# Patient Record
Sex: MALE | Race: WHITE | Employment: UNEMPLOYED | URBAN - METROPOLITAN AREA
[De-identification: names, ages, dates, MRNs, and addresses within clinical notes are randomized per-mention and may not be internally consistent; named-entity substitution may affect disease eponyms.]

---

## 2024-01-23 ENCOUNTER — TELEPHONE (OUTPATIENT)
Dept: PULMONOLOGY | Age: 69
End: 2024-01-23

## 2024-01-23 NOTE — TELEPHONE ENCOUNTER
Npt r/b CONRADO- Salas, Lung Nodule  Please advise for scheduling.    **Imaging and referral scanned in media.

## 2024-01-30 ENCOUNTER — OFFICE VISIT (OUTPATIENT)
Dept: PULMONOLOGY | Age: 69
End: 2024-01-30
Payer: MEDICARE

## 2024-01-30 VITALS
BODY MASS INDEX: 32.95 KG/M2 | HEIGHT: 66 IN | OXYGEN SATURATION: 95 % | RESPIRATION RATE: 18 BRPM | DIASTOLIC BLOOD PRESSURE: 90 MMHG | HEART RATE: 81 BPM | WEIGHT: 205 LBS | SYSTOLIC BLOOD PRESSURE: 158 MMHG

## 2024-01-30 DIAGNOSIS — R91.1 LUNG NODULE: Primary | ICD-10-CM

## 2024-01-30 PROCEDURE — 99214 OFFICE O/P EST MOD 30 MIN: CPT | Performed by: INTERNAL MEDICINE

## 2024-01-30 PROCEDURE — 1123F ACP DISCUSS/DSCN MKR DOCD: CPT | Performed by: INTERNAL MEDICINE

## 2024-01-30 RX ORDER — FLUTICASONE FUROATE, UMECLIDINIUM BROMIDE AND VILANTEROL TRIFENATATE 100; 62.5; 25 UG/1; UG/1; UG/1
1 POWDER RESPIRATORY (INHALATION) DAILY
Qty: 1 EACH | Refills: 2 | Status: SHIPPED | OUTPATIENT
Start: 2024-01-30

## 2024-01-30 RX ORDER — FLUTICASONE FUROATE, UMECLIDINIUM BROMIDE AND VILANTEROL TRIFENATATE 100; 62.5; 25 UG/1; UG/1; UG/1
1 POWDER RESPIRATORY (INHALATION) DAILY
Qty: 1 EACH | Refills: 0 | Status: SHIPPED | COMMUNITY
Start: 2024-01-30

## 2024-01-30 NOTE — PROGRESS NOTES
P  [facility]                                               Pulmonary Clinic Consult     I had the pleasure of seeing  Tj Kim     Chief Complaint   Patient presents with    New Patient     Obtaining imaging from [facility]   RADHA grey/morena godoy       HISTORY OF PRESENT ILLNESS:    Tj Kim is a 68 y.o. year old  Who start smoking at age    15-year-old and smoked 3 packs for about 20 to 25 years and has about 90 pack.  Smoking 200    The Patient comes in with SOB that has been going on for last 2 years associated with no cough,He/She  states that it get worse with exercise or walking long distance and he can walk can walk less than a block and he will stop at 150 to 200 feet.. And go less than 1 flight of flight of stairs before get short winded    He uses no inhalers    Had a CAT scan of the chest in January 19 shows left lung nodule left upper lobe lobe however he was told that he has other nodules which was a stable and this 1 is a new but he is not sure    He said he had CT chest in the past every year and was told about some Leonor new nodule however this when he was told it sinew for which she was referred to    ALLERGIES:  Not on File    PAST MEDICAL HISTORY: No past medical history on file.    MEDICATIONS:   No current outpatient medications on file.     No current facility-administered medications for this visit.       SOCIAL AND OCCUPATIONAL HEALTH:  Social History     Tobacco Use   Smoking Status Not on file   Smokeless Tobacco Not on file     TB :  Pets   Industrial exposure:  Birds :    SURGICAL HISTORY:   No past surgical history on file.    FAMILY HISTORY:   Lung cancer:  DVT or PE     REVIEW OF SYSTEMS:  Constitutional: General health is good . There has been no weight changes. No fevers, fatigue or weakness.   Head: Patient denies any history of trauma, convulsive disorder or syncope.    Skin:  Patient denies history of changes in pigmentation, eruptions or pruritus.

## 2024-01-31 NOTE — TELEPHONE ENCOUNTER
Called [facility] and they are sending over medical records via fax please watch .

## 2024-02-02 NOTE — TELEPHONE ENCOUNTER
Spoke w/ [facility] and asked for pt's chart to be merged. Scanned written pet scan from [facility] into pt's chart.

## 2024-02-02 NOTE — TELEPHONE ENCOUNTER
Pt spouse called asking about PET scan results.  Advised that we still don't have results yet, but I will call medical records at [facility] and ask for them to be sent again. Spouse states that she has stayed home all week to be available when we called and she hadn't heard anything, she wants to go see her mother today and requests a call back ASAP.     Called [facility] medical records and requested report from Jennifer.   Also called [facility] radiology and requested to have PET scan pushed to PACS as it isn't available in PACS yet-spoke with Deonna.

## 2024-02-14 ENCOUNTER — HOSPITAL ENCOUNTER (OUTPATIENT)
Dept: PULMONOLOGY | Age: 69
Discharge: HOME | End: 2024-02-14
Payer: MEDICARE

## 2024-02-14 VITALS — OXYGEN SATURATION: 96 %

## 2024-02-14 DIAGNOSIS — R91.1 LUNG NODULE: ICD-10-CM

## 2024-02-14 LAB
DLCO %PRED: 101 %
DLCO PRED: NORMAL
DLCO/VA %PRED: NORMAL
DLCO/VA PRED: NORMAL
DLCO/VA: NORMAL
DLCO: NORMAL
EXPIRATORY TIME-POST: NORMAL
EXPIRATORY TIME: NORMAL
FEF 25-75 %CHNG: NORMAL
FEF 25-75 POST %PRED: NORMAL
FEF 25-75% %PRED-PRE: NORMAL
FEF 25-75% PRED: NORMAL
FEF 25-75-POST: NORMAL
FEF 25-75-PRE: NORMAL
FEV1 %PRED-POST: 77 %
FEV1 %PRED-PRE: 83 %
FEV1 PRED: NORMAL
FEV1-POST: NORMAL
FEV1-PRE: NORMAL
FEV1/FVC %PRED-POST: NORMAL
FEV1/FVC %PRED-PRE: NORMAL
FEV1/FVC PRED: NORMAL
FEV1/FVC-POST: 67 %
FEV1/FVC-PRE: 70 %
FVC %PRED-POST: NORMAL
FVC %PRED-PRE: NORMAL
FVC PRED: NORMAL
FVC-POST: NORMAL
FVC-PRE: NORMAL
GAW %PRED: NORMAL
GAW PRED: NORMAL
GAW: NORMAL
IC PRE %PRED: NORMAL
IC PRED: NORMAL
IC: NORMAL
MEP: NORMAL
MIP: NORMAL
MVV %PRED-PRE: NORMAL
MVV PRED: NORMAL
MVV-PRE: NORMAL
PEF %PRED-POST: NORMAL
PEF %PRED-PRE: NORMAL
PEF PRED: NORMAL
PEF%CHNG: NORMAL
PEF-POST: NORMAL
PEF-PRE: NORMAL
RAW %PRED: NORMAL
RAW PRED: NORMAL
RAW: NORMAL
RV PRE %PRED: NORMAL
RV PRED: NORMAL
RV: NORMAL
SVC %PRED: NORMAL
SVC PRED: NORMAL
SVC: NORMAL
TLC PRE %PRED: 110 %
TLC PRED: NORMAL
TLC: NORMAL
VA %PRED: NORMAL
VA PRED: NORMAL
VA: NORMAL
VTG %PRED: NORMAL
VTG PRED: NORMAL
VTG: NORMAL

## 2024-02-14 PROCEDURE — 94726 PLETHYSMOGRAPHY LUNG VOLUMES: CPT

## 2024-02-14 PROCEDURE — 6370000000 HC RX 637 (ALT 250 FOR IP): Performed by: INTERNAL MEDICINE

## 2024-02-14 PROCEDURE — 94060 EVALUATION OF WHEEZING: CPT

## 2024-02-14 PROCEDURE — 94760 N-INVAS EAR/PLS OXIMETRY 1: CPT

## 2024-02-14 PROCEDURE — 94640 AIRWAY INHALATION TREATMENT: CPT

## 2024-02-14 PROCEDURE — 94729 DIFFUSING CAPACITY: CPT

## 2024-02-14 RX ORDER — ALBUTEROL SULFATE 90 UG/1
4 AEROSOL, METERED RESPIRATORY (INHALATION) ONCE
Status: COMPLETED | OUTPATIENT
Start: 2024-02-14 | End: 2024-02-14

## 2024-02-14 RX ADMIN — Medication 4 PUFF: at 12:08

## 2024-02-14 ASSESSMENT — PULMONARY FUNCTION TESTS
FEV1/FVC_POST: 67
FEV1_PERCENT_PREDICTED_PRE: 83
FEV1/FVC_PRE: 70
FEV1_PERCENT_PREDICTED_POST: 77

## 2024-02-15 PROBLEM — R91.1 LUNG NODULE: Status: ACTIVE | Noted: 2024-02-15

## 2024-02-15 NOTE — PROCEDURES
[address] [address]                               PULMONARY FUNCTION    PATIENT NAME: TALIA KIMBROUGH                          :        1955  MED REC NO:   [id number]                          ROOM:  ACCOUNT NO:   [id number]                           ADMIT DATE: 2024  PROVIDER:     José Luis Solis MD    DATE OF PROCEDURE:  2024    INDICATION:  Lung nodule.    FINDINGS:  1.  Spirometry reveals no evidence of obstructive defect.  FEV1 is 2.22  liters, which is 83% of predicted.  No significant response to  bronchodilators.  FEV1/FVC ratio of 70%.  2.  Lung volume revealed normal total lung capacity of 6.9 liters, which  is 110% of predicted.  Evidence of air trapping.  Residual volume of  3.47 liters, which is 157% of predicted.  3.  Diffusion capacity is 23.55, which is 101% of predicted.  4.  Flow volume loops appeared to be normal.    CONCLUSION:  1.  No evidence of obstructive defect or restrictive defect.  2.  Normal diffusion capacity.  3.  Evidence of air trapping that could be seen in small airway disease.  Clinical correlation is warranted.        JOSÉ LUIS SOLIS MD    D: 2024 17:09:31       T: 2024 19:52:35     [id number]  Job#: [id number]     Doc#: [id number]    CC:

## 2024-02-19 ENCOUNTER — TELEPHONE (OUTPATIENT)
Dept: PULMONOLOGY | Age: 69
End: 2024-02-19

## 2024-02-21 NOTE — TELEPHONE ENCOUNTER
Per marin pt information is in nuclus but since Pt has not had any imaging here yet we can not merge chart.  Will merge once Pt has testing done here.

## 2024-03-13 ENCOUNTER — OFFICE VISIT (OUTPATIENT)
Dept: PULMONOLOGY | Age: 69
End: 2024-03-13
Payer: MEDICARE

## 2024-03-13 DIAGNOSIS — R91.1 LUNG NODULE: Primary | ICD-10-CM

## 2024-03-13 PROCEDURE — 99214 OFFICE O/P EST MOD 30 MIN: CPT | Performed by: INTERNAL MEDICINE

## 2024-03-13 PROCEDURE — 1123F ACP DISCUSS/DSCN MKR DOCD: CPT | Performed by: INTERNAL MEDICINE

## 2024-03-13 NOTE — PROGRESS NOTES
P  [facility]                                               Pulmonary Clinic Consult     I had the pleasure of seeing  Tj Kim     Chief Complaint   Patient presents with    Follow-up     Discuss pet results       HISTORY OF PRESENT ILLNESS:    Tj Kim is a 68 y.o. year old  Who start smoking at age    15-year-old and smoked 3 packs for about 20 to 25 years and has about 90 pack.     Quit 2010  The Patient comes in with SOB that has been going on for last 2 years associated with no cough,He/She  states that it get worse with exercise or walking long distance and he can walk can walk less than a block and he will stop at 150 to 200 feet.. And go less than 1 flight of flight of stairs before get short winded    He uses no inhalers    Had a CAT scan of the chest in January 19 shows left lung nodule left upper lobe lobe however he was told that he has other nodules which was a stable and this 1 is a new but he is not sure    He said he had CT chest in the past every year and was told about some Leonor new nodule however this when he was told it sinew for which she was referred to    Today   Still with SOB and CHRISTIE  Still some cough   Lost 4 pounds  Tested for sleep apnea and was told no MNADEEP    Cam for follow up lung nodule   ALLERGIES:  No Known Allergies    PAST MEDICAL HISTORY: No past medical history on file.    MEDICATIONS:   Current Outpatient Medications   Medication Sig Dispense Refill    fluticasone-umeclidin-vilant (TRELEGY ELLIPTA) 100-62.5-25 MCG/ACT AEPB inhaler Inhale 1 puff into the lungs daily 1 each 2    fluticasone-umeclidin-vilant (TRELEGY ELLIPTA) 100-62.5-25 MCG/ACT AEPB inhaler Inhale 1 puff into the lungs daily 1 each 0     No current facility-administered medications for this visit.       SOCIAL AND OCCUPATIONAL HEALTH:  Social History     Tobacco Use   Smoking Status Former    Current packs/day: 0.00    Types: Cigarettes    Quit date: 1/1/1970

## 2024-03-14 ENCOUNTER — TELEPHONE (OUTPATIENT)
Dept: PULMONOLOGY | Age: 69
End: 2024-03-14

## 2024-03-14 NOTE — TELEPHONE ENCOUNTER
(Inserted Image. Unable to display)       January 24, 2019      ROSALIO ANDRADE  [address] [place], [place] [id number]          Dear ROSALIO,      Thank you for selecting [facility] for your healthcare needs.     Our records indicate you are due for the following services:    Non-Fasting Labs  Urine Labs ~ Please be prepared to leave a urine specimen for evaluation.    If you had your labs done at another facility or with [facility], please bring in a copy of the results to your next visit, mail a copy, or drop off a copy of your results to your Healthcare Provider.    Diabetic Exam ~ Please bring your glucose meter and/or your blood glucose diary to your appointment.    You are due for lab work and an office visit; please schedule the lab appointment 1 week before the office visit.  This will assure all results are available to discuss with your Healthcare Provider during your visit.    **It is very helpful if you bring your medication bottles to your appointment.  This assures we have all of your current medications, including strength and dosing information, documented accurately in your medical record.    To schedule an appointment or if you have further questions, please contact your primary clinic:   [facility]       [phone]   [facility]       [phone]              [facility]     [phone]    Powered by [employer]    Sincerely,    Kb Mcqueen MD   Pt's spouse called and stated that pt completed CT on 3/13/24 @ [facility] in [facility]. Results will be sent to [facility] Delia Dunn upon report completion. Please advise.

## 2024-04-04 ENCOUNTER — OFFICE VISIT (OUTPATIENT)
Dept: PULMONOLOGY | Age: 69
End: 2024-04-04
Payer: MEDICARE

## 2024-04-04 VITALS
SYSTOLIC BLOOD PRESSURE: 142 MMHG | HEART RATE: 62 BPM | BODY MASS INDEX: 32.95 KG/M2 | OXYGEN SATURATION: 95 % | HEIGHT: 66 IN | DIASTOLIC BLOOD PRESSURE: 62 MMHG | RESPIRATION RATE: 18 BRPM | WEIGHT: 205 LBS

## 2024-04-04 DIAGNOSIS — R91.1 LUNG NODULE: Primary | ICD-10-CM

## 2024-04-04 PROCEDURE — 99214 OFFICE O/P EST MOD 30 MIN: CPT | Performed by: INTERNAL MEDICINE

## 2024-04-04 PROCEDURE — 1123F ACP DISCUSS/DSCN MKR DOCD: CPT | Performed by: INTERNAL MEDICINE

## 2024-04-04 NOTE — PROGRESS NOTES
P  Pulmonary[facility]                                               Pulmonary Clinic Consult     I had the pleasure of seeing  Tj Kim     Chief Complaint   Patient presents with    Follow-up     CT       HISTORY OF PRESENT ILLNESS:    Tj Kim is a 68 y.o. year old  Who start smoking at age    15-year-old and smoked 3 packs for about 20 to 25 years and has about 90 pack.     Quit 2010  The Patient comes in with SOB that has been going on for last 2 years associated with no cough,He/She  states that it get worse with exercise or walking long distance and he can walk can walk less than a block and he will stop at 150 to 200 feet.. And go less than 1 flight of flight of stairs before get short winded    He uses no inhalers    Had a CAT scan of the chest in January 19 shows left lung nodule left upper lobe lobe however he was told that he has other nodules which was a stable and this 1 is a new but he is not sure    He said he had CT chest in the past every year and was told about some Leonor new nodule however this when he was told it sinew for which she was referred to    Today   Still with SOB and SHAHNAZ Thompson helping   Lost 4 pounds  Tested for sleep apnea and was told no MANDEEP,did not further study  Came for follow up CT     Cam for follow up lung nodule   ALLERGIES:  No Known Allergies    PAST MEDICAL HISTORY: No past medical history on file.    MEDICATIONS:   Current Outpatient Medications   Medication Sig Dispense Refill    fluticasone-umeclidin-vilant (TRELEGY ELLIPTA) 100-62.5-25 MCG/ACT AEPB inhaler Inhale 1 puff into the lungs daily 1 each 2    fluticasone-umeclidin-vilant (TRELEGY ELLIPTA) 100-62.5-25 MCG/ACT AEPB inhaler Inhale 1 puff into the lungs daily 1 each 0     No current facility-administered medications for this visit.       SOCIAL AND OCCUPATIONAL HEALTH:  Social History     Tobacco Use   Smoking Status Former    Current packs/day: 0.00    Types: Cigarettes

## 2024-05-23 RX ORDER — FLUTICASONE FUROATE, UMECLIDINIUM BROMIDE AND VILANTEROL TRIFENATATE 100; 62.5; 25 UG/1; UG/1; UG/1
POWDER RESPIRATORY (INHALATION)
Qty: 60 EACH | Refills: 5 | Status: SHIPPED | OUTPATIENT
Start: 2024-05-23

## 2024-07-26 DIAGNOSIS — R91.1 LUNG NODULE: Primary | ICD-10-CM

## 2024-07-29 RX ORDER — FLUTICASONE FUROATE, UMECLIDINIUM BROMIDE AND VILANTEROL TRIFENATATE 100; 62.5; 25 UG/1; UG/1; UG/1
1 POWDER RESPIRATORY (INHALATION) DAILY
Qty: 1 EACH | Refills: 0 | COMMUNITY
Start: 2024-07-29

## 2024-08-21 ENCOUNTER — TELEPHONE (OUTPATIENT)
Dept: PULMONOLOGY | Age: 69
End: 2024-08-21

## 2024-08-27 RX ORDER — FLUTICASONE FUROATE, UMECLIDINIUM BROMIDE AND VILANTEROL TRIFENATATE 100; 62.5; 25 UG/1; UG/1; UG/1
1 POWDER RESPIRATORY (INHALATION) DAILY
Qty: 1 EACH | Refills: 0 | Status: CANCELLED | COMMUNITY
Start: 2024-08-27

## 2024-10-04 ENCOUNTER — HOSPITAL ENCOUNTER (OUTPATIENT)
Dept: CT IMAGING | Age: 69
Discharge: HOME | End: 2024-10-04
Payer: MEDICARE

## 2024-10-04 DIAGNOSIS — R91.1 LUNG NODULE: ICD-10-CM

## 2024-10-04 PROCEDURE — 71250 CT THORAX DX C-: CPT

## 2024-10-22 ENCOUNTER — TELEPHONE (OUTPATIENT)
Dept: PULMONOLOGY | Age: 69
End: 2024-10-22

## 2024-10-25 NOTE — TELEPHONE ENCOUNTER
Patient was informed of results and verbalized understanding  
Please look at CT results and advise. Pt wants the results  
Skin normal color for race, warm, dry and intact. No evidence of rash.

## 2025-01-14 ENCOUNTER — OFFICE VISIT (OUTPATIENT)
Dept: PULMONOLOGY | Age: 70
End: 2025-01-14
Payer: MEDICARE

## 2025-01-14 VITALS
HEIGHT: 66 IN | WEIGHT: 214.2 LBS | DIASTOLIC BLOOD PRESSURE: 60 MMHG | HEART RATE: 76 BPM | BODY MASS INDEX: 34.42 KG/M2 | SYSTOLIC BLOOD PRESSURE: 138 MMHG | RESPIRATION RATE: 16 BRPM | OXYGEN SATURATION: 95 %

## 2025-01-14 DIAGNOSIS — R91.1 LUNG NODULE: Primary | ICD-10-CM

## 2025-01-14 PROCEDURE — 99214 OFFICE O/P EST MOD 30 MIN: CPT | Performed by: INTERNAL MEDICINE

## 2025-01-14 PROCEDURE — 1159F MED LIST DOCD IN RCRD: CPT | Performed by: INTERNAL MEDICINE

## 2025-01-14 PROCEDURE — 1123F ACP DISCUSS/DSCN MKR DOCD: CPT | Performed by: INTERNAL MEDICINE

## 2025-01-14 NOTE — PROGRESS NOTES
P  [facility]                                               Pulmonary Clinic Consult     I had the pleasure of seeing  Tj Kim     Chief Complaint   Patient presents with    3 Month Follow-Up    Results     CT 10/4/24       HISTORY OF PRESENT ILLNESS:    Tj Kim is a 69 y.o. year old  Who start smoking at age    15-year-old and smoked 3 packs for about 20 to 25 years and has about 90 pack.     Quit 2010  The Patient comes in with SOB that has been going on for last 2 years associated with no cough,He/She  states that it get worse with exercise or walking long distance and he can walk can walk less than a block and he will stop at 150 to 200 feet.. And go less than 1 flight of flight of stairs before get short winded    He uses no inhalers    Had a CAT scan of the chest in January 19 shows left lung nodule left upper lobe lobe however he was told that he has other nodules which was a stable and this 1 is a new but he is not sure    He said he had CT chest in the past every year and was told about some Leonor new nodule however this when he was told it sinew for which she was referred to    Today visit  Has been doing great with trelegy   Still with SOB and CHRISTIE  Trelegy helping   Lost 4 pounds  Tested for sleep apnea and was told no MANDEEP,did not further study  Came for follow up CT     Cam for follow up lung nodule   ALLERGIES:  No Known Allergies    PAST MEDICAL HISTORY: History reviewed. No pertinent past medical history.    MEDICATIONS:   Current Outpatient Medications   Medication Sig Dispense Refill    fluticasone-umeclidin-vilant (TRELEGY ELLIPTA) 100-62.5-25 MCG/ACT AEPB inhaler Inhale 1 puff into the lungs daily 1 each 11    fluticasone-umeclidin-vilant (TRELEGY ELLIPTA) 100-62.5-25 MCG/ACT AEPB inhaler Inhale 1 puff into the lungs daily 1 each 0    TRELEGY ELLIPTA 100-62.5-25 MCG/ACT AEPB inhaler INHALE ONE (1) PUFF INTO THE LUNGS DAILY 60 each 5

## 2025-07-09 DIAGNOSIS — R91.1 LUNG NODULE: Primary | ICD-10-CM

## 2025-08-12 ENCOUNTER — HOSPITAL ENCOUNTER (OUTPATIENT)
Dept: CT IMAGING | Age: 70
Discharge: HOME | End: 2025-08-12
Attending: INTERNAL MEDICINE
Payer: MEDICARE

## 2025-08-12 DIAGNOSIS — R91.1 LUNG NODULE: ICD-10-CM

## 2025-08-12 PROCEDURE — 71250 CT THORAX DX C-: CPT
